# Patient Record
Sex: MALE | Race: WHITE | NOT HISPANIC OR LATINO | URBAN - METROPOLITAN AREA
[De-identification: names, ages, dates, MRNs, and addresses within clinical notes are randomized per-mention and may not be internally consistent; named-entity substitution may affect disease eponyms.]

---

## 2018-03-30 ENCOUNTER — EMERGENCY (EMERGENCY)
Facility: HOSPITAL | Age: 33
LOS: 1 days | Discharge: HOME | End: 2018-03-30
Admitting: EMERGENCY MEDICINE
Payer: MEDICAID

## 2018-03-30 PROCEDURE — 99283 EMERGENCY DEPT VISIT LOW MDM: CPT | Mod: 25

## 2018-03-31 ENCOUNTER — EMERGENCY (EMERGENCY)
Facility: HOSPITAL | Age: 33
LOS: 1 days | Discharge: HOME | End: 2018-03-31
Admitting: EMERGENCY MEDICINE
Payer: MEDICAID

## 2018-03-31 VITALS
DIASTOLIC BLOOD PRESSURE: 71 MMHG | OXYGEN SATURATION: 100 % | RESPIRATION RATE: 20 BRPM | HEART RATE: 82 BPM | SYSTOLIC BLOOD PRESSURE: 125 MMHG | TEMPERATURE: 98 F

## 2018-03-31 VITALS
SYSTOLIC BLOOD PRESSURE: 115 MMHG | TEMPERATURE: 97 F | OXYGEN SATURATION: 100 % | RESPIRATION RATE: 18 BRPM | DIASTOLIC BLOOD PRESSURE: 75 MMHG | HEART RATE: 89 BPM

## 2018-03-31 VITALS
HEART RATE: 68 BPM | SYSTOLIC BLOOD PRESSURE: 101 MMHG | DIASTOLIC BLOOD PRESSURE: 68 MMHG | RESPIRATION RATE: 16 BRPM | OXYGEN SATURATION: 98 % | TEMPERATURE: 98 F

## 2018-03-31 DIAGNOSIS — F43.24 ADJUSTMENT DISORDER WITH DISTURBANCE OF CONDUCT: ICD-10-CM

## 2018-03-31 DIAGNOSIS — R21 RASH AND OTHER NONSPECIFIC SKIN ERUPTION: ICD-10-CM

## 2018-03-31 DIAGNOSIS — Z76.5 MALINGERER [CONSCIOUS SIMULATION]: ICD-10-CM

## 2018-03-31 DIAGNOSIS — Z91.010 ALLERGY TO PEANUTS: ICD-10-CM

## 2018-03-31 DIAGNOSIS — F32.9 MAJOR DEPRESSIVE DISORDER, SINGLE EPISODE, UNSPECIFIED: ICD-10-CM

## 2018-03-31 LAB
ANION GAP SERPL CALC-SCNC: 9 MMOL/L — SIGNIFICANT CHANGE UP (ref 9–16)
APAP SERPL-MCNC: 3 UG/ML — LOW (ref 10–30)
APPEARANCE UR: (no result)
BASOPHILS NFR BLD AUTO: 0.5 % — SIGNIFICANT CHANGE UP (ref 0–2)
BILIRUB UR-MCNC: (no result)
BUN SERPL-MCNC: 13 MG/DL — SIGNIFICANT CHANGE UP (ref 7–23)
CALCIUM SERPL-MCNC: 8.9 MG/DL — SIGNIFICANT CHANGE UP (ref 8.5–10.5)
CHLORIDE SERPL-SCNC: 105 MMOL/L — SIGNIFICANT CHANGE UP (ref 96–108)
CO2 SERPL-SCNC: 28 MMOL/L — SIGNIFICANT CHANGE UP (ref 22–31)
COLOR SPEC: YELLOW — SIGNIFICANT CHANGE UP
CREAT SERPL-MCNC: 0.73 MG/DL — SIGNIFICANT CHANGE UP (ref 0.5–1.3)
DIFF PNL FLD: NEGATIVE — SIGNIFICANT CHANGE UP
EOSINOPHIL NFR BLD AUTO: 3.3 % — SIGNIFICANT CHANGE UP (ref 0–6)
ETHANOL SERPL-MCNC: <3 MG/DL — SIGNIFICANT CHANGE UP
GLUCOSE SERPL-MCNC: 89 MG/DL — SIGNIFICANT CHANGE UP (ref 70–99)
GLUCOSE UR QL: NEGATIVE — SIGNIFICANT CHANGE UP
HCT VFR BLD CALC: 36.4 % — LOW (ref 39–50)
HGB BLD-MCNC: 12.4 G/DL — LOW (ref 13–17)
IMM GRANULOCYTES NFR BLD AUTO: 0.2 % — SIGNIFICANT CHANGE UP (ref 0–1.5)
KETONES UR-MCNC: (no result) MG/DL
LEUKOCYTE ESTERASE UR-ACNC: NEGATIVE — SIGNIFICANT CHANGE UP
LYMPHOCYTES # BLD AUTO: 20.6 % — SIGNIFICANT CHANGE UP (ref 13–44)
MCHC RBC-ENTMCNC: 31.2 PG — SIGNIFICANT CHANGE UP (ref 27–34)
MCHC RBC-ENTMCNC: 34.1 G/DL — SIGNIFICANT CHANGE UP (ref 32–36)
MCV RBC AUTO: 91.5 FL — SIGNIFICANT CHANGE UP (ref 80–100)
MONOCYTES NFR BLD AUTO: 8.3 % — SIGNIFICANT CHANGE UP (ref 2–14)
NEUTROPHILS NFR BLD AUTO: 67.1 % — SIGNIFICANT CHANGE UP (ref 43–77)
NITRITE UR-MCNC: NEGATIVE — SIGNIFICANT CHANGE UP
PCP SPEC-MCNC: SIGNIFICANT CHANGE UP
PH UR: 8 — SIGNIFICANT CHANGE UP (ref 5–8)
PLATELET # BLD AUTO: 385 K/UL — SIGNIFICANT CHANGE UP (ref 150–400)
POTASSIUM SERPL-MCNC: 3.5 MMOL/L — SIGNIFICANT CHANGE UP (ref 3.5–5.3)
POTASSIUM SERPL-SCNC: 3.5 MMOL/L — SIGNIFICANT CHANGE UP (ref 3.5–5.3)
PROT UR-MCNC: (no result) MG/DL
RBC # BLD: 3.98 M/UL — LOW (ref 4.2–5.8)
RBC # FLD: 12.6 % — SIGNIFICANT CHANGE UP (ref 10.3–16.9)
SALICYLATES SERPL-MCNC: 1.2 MG/DL — LOW (ref 2.8–20)
SODIUM SERPL-SCNC: 142 MMOL/L — SIGNIFICANT CHANGE UP (ref 132–145)
SP GR SPEC: 1.02 — SIGNIFICANT CHANGE UP (ref 1–1.03)
TSH SERPL-MCNC: 2.7 UIU/ML — SIGNIFICANT CHANGE UP (ref 0.36–3.74)
UROBILINOGEN FLD QL: 0.2 E.U./DL — SIGNIFICANT CHANGE UP
WBC # BLD: 9.6 K/UL — SIGNIFICANT CHANGE UP (ref 3.8–10.5)
WBC # FLD AUTO: 9.6 K/UL — SIGNIFICANT CHANGE UP (ref 3.8–10.5)

## 2018-03-31 PROCEDURE — 90792 PSYCH DIAG EVAL W/MED SRVCS: CPT | Mod: GT

## 2018-03-31 PROCEDURE — 99284 EMERGENCY DEPT VISIT MOD MDM: CPT | Mod: 25

## 2018-03-31 RX ORDER — OXYCODONE AND ACETAMINOPHEN 5; 325 MG/1; MG/1
1 TABLET ORAL ONCE
Qty: 0 | Refills: 0 | Status: COMPLETED | OUTPATIENT
Start: 2018-03-31 | End: 2018-03-31

## 2018-03-31 RX ORDER — FLUOXETINE HCL 10 MG
1 CAPSULE ORAL
Qty: 7 | Refills: 0 | OUTPATIENT
Start: 2018-03-31 | End: 2018-04-06

## 2018-03-31 RX ORDER — KETOROLAC TROMETHAMINE 30 MG/ML
30 SYRINGE (ML) INJECTION ONCE
Qty: 0 | Refills: 0 | Status: COMPLETED | OUTPATIENT
Start: 2018-03-31 | End: 2018-03-31

## 2018-03-31 NOTE — ED PROVIDER NOTE - CONSTITUTIONAL, MLM
normal... Unkempt, but awake, alert, oriented to person, place, time/situation and in no apparent distress.

## 2018-03-31 NOTE — ED BEHAVIORAL HEALTH ASSESSMENT NOTE - DESCRIPTION (FIRST USE, LAST USE, QUANTITY, FREQUENCY, DURATION)
unclear - pt is unreliable historian pt states she has "tried everything" but denies any recent or ongoing use of illicit drugs

## 2018-03-31 NOTE — ED ADULT NURSE REASSESSMENT NOTE - NS ED NURSE REASSESS COMMENT FT1
1:1 discontinued, belongings returned, pt moved from room 23 to Chair A awaiting disposition. Will monitor and f/u as indicated.

## 2018-03-31 NOTE — ED PROVIDER NOTE - PHYSICAL EXAMINATION
VITAL SIGNS: I have reviewed nursing notes and confirm.  CONSTITUTIONAL: Well-developed; well-nourished; in no acute distress.  SKIN: Skin is warm and dry, no acute rash.  HEAD: Normocephalic; atraumatic.  EYES: PERRL, EOM intact; conjunctiva and sclera clear.  ENT: No nasal discharge; airway clear.  NECK: Supple; non tender.  CARD: S1, S2 normal; no murmurs, gallops, or rubs. Regular rate and rhythm.  RESP: No wheezes, rales or rhonchi.  ABD: Normal bowel sounds; soft; non-distended; non-tender; no hepatosplenomegaly.  BACK: No cervical, thoracic or lumbar spine tenderness to percussion or palpation. Flexion/extension of spine without pain. LE strength 5/5, sensation intact, no SI joint tenderness, SLR negative bilaterally  EXT: Normal ROM. No clubbing, cyanosis or edema.  NEURO: Alert, oriented. Grossly unremarkable.  PSYCH: Cooperative, appropriate

## 2018-03-31 NOTE — ED PROVIDER NOTE - PROGRESS NOTE DETAILS
Spoke with Dr. Lebron (psychiatry) who states patient is stable. Concerned for malingering also concerned of patient coming down from amphetamines, which urine is positive for.

## 2018-03-31 NOTE — ED ADULT NURSE NOTE - OBJECTIVE STATEMENT
Walk in c/o SI, denies HI, pt without plan, no attempt or injury on arrival, denies ETOH or Drug Use. Pt is transgender male --> female, states " I have no where to sleep or rest because everyone treats me like im a criminal" Pt with pressured speech, requesting food and beverage, meal provided. Lab samples collected and pt placed on 1:1 observation for safety. Will monitor and f/u as indicated.

## 2018-03-31 NOTE — ED PROVIDER NOTE - PROGRESS NOTE DETAILS
patient given medication for pain.  continues to just sleep in recliner. no SI/HI. patient offered to talk to psych, but declined. patient refusing to leave, just wants to sleep in recliner. escorted out by security.

## 2018-03-31 NOTE — ED PROVIDER NOTE - MEDICAL DECISION MAKING DETAILS
chronic pain complaint. no new injury/trauma, no indication for imaging. offered to speak to psych, but patient declined. no acute psychosis, no SI/HI.
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
## 2018-03-31 NOTE — ED PROVIDER NOTE - PSYCHIATRIC, MLM
Alert and oriented to person, place, time/situation. normal mood and affect. no apparent risk to self or others. No SI or HI.

## 2018-03-31 NOTE — ED ADULT NURSE NOTE - NSSISCREENINGSIGNS_ED_A_ED
triggering event  (ex. pending homelessness / incarceration)/anxiety/agitation/substance abuse ( +sbirt)

## 2018-03-31 NOTE — ED BEHAVIORAL HEALTH ASSESSMENT NOTE - SUMMARY
Pt's history & presentation are most consistent with a diagnosis of malingering for the secondary gain of room & board, though given her destitute psychosocial circumstances, an adjustment disorder with mixed anxiety & depressed mood is a reasonable rule out diagnosis.  Pt does not demonstrate any objective signs of psychosis & provides conflicting descriptions of her symptoms to various providers.  As such, it is my opinion that her suicidal ideation was feigned so that she could gain at least temporary admittance to the hospital.    From a psychiatric standpoint, this patient is stable & appropriate for discharge from the ED.

## 2018-03-31 NOTE — ED ADULT TRIAGE NOTE - CHIEF COMPLAINT QUOTE
walkin patient with complaints of suicidal ideations, plan to walk into traffic. Patient reports hx bipolar d/o ad has been without meds x 1 week. Patient has no thoughts of hurting other people. Patient is male -> female transgender. Also c/o sores/rash on legs that come and go 'for years.'

## 2018-03-31 NOTE — ED BEHAVIORAL HEALTH ASSESSMENT NOTE - OTHER
Pt provided with instructions on how to present to [facility] in [place] n/a Magn[place] Michelle not assessed

## 2018-03-31 NOTE — ED ADULT TRIAGE NOTE - CHIEF COMPLAINT QUOTE
Pt presents to ED with multiple medical complaints- patient reports a rash on her arms, legs and back, states she has been feeling more 'fatigued' than usual and short of breath. Patient's O2 Saturation 100% on RA, speaks in full sentences. Reports a hx of bipolar. Pt was recently evicted from her apartment, and she has been sleeping in the streets. Of note, patient was in the waiting area for > an hour before deciding to register.

## 2018-03-31 NOTE — ED PROVIDER NOTE - MEDICAL DECISION MAKING DETAILS
Pt presents c/o non specific, diffuse excoriated lesions and requesting psych evaluation. Will have pt rest with 1 to 1 DC. After speaking with psych, will have psychiatry assess after pt wakes up. Labs ordered. Pt presents c/o non specific, diffuse excoriated lesions and requesting psych evaluation. Will have pt rest with 1 to 1 DC. After speaking with psych, will have psychiatry assess after pt wakes up. Labs ordered.  cleared by psych pt reports feeling much better after sleeping in ed for 8 hours

## 2018-03-31 NOTE — ED BEHAVIORAL HEALTH ASSESSMENT NOTE - RISK ASSESSMENT
Pt is at chronically elevated risk for self-harm due to her reported history of suicide attempts & inpatient hospitalization.  This risk is acutely elevated by her recently becoming homeless & substance use; it is mitigated by her denial of suicidal ideation at the time of interview, future orientation & treatment seeking behavior.  In aggregate, pt's acute risk of harm to self is low.    Pt's chronic & acute risk of harm to others is low.

## 2018-03-31 NOTE — ED PROVIDER NOTE - OBJECTIVE STATEMENT
32 year old transgender M --> F presents with complaints of chronic pain. reports chronic pain for years, "takes whatever I can find for it." requesting medication for pain.  denies any new injury or trauma. admits to drinking alcohol tonight. no SI/HI.   very poor historian, continues to fall asleep during exam. was seen multiple times and will not stay awake. patient reports very sleepy

## 2018-03-31 NOTE — ED BEHAVIORAL HEALTH ASSESSMENT NOTE - REFERRAL / APPOINTMENT DETAILS
Pt to follow-up with established outpatient providers at [facility]; has follow-up appointment with her psychiatrist scheduled for Tuesday, April 3rd.

## 2018-03-31 NOTE — ED BEHAVIORAL HEALTH ASSESSMENT NOTE - DESCRIPTION
Pt slept for several hours prior to interview.  Otherwise none. migraine headaches Pt born & raised in [place] to a highly Bahai family who have essentially disowned her due to her transgender status. Moved to [place] roughly 12-13 years ago.

## 2018-03-31 NOTE — ED BEHAVIORAL HEALTH ASSESSMENT NOTE - OTHER PAST PSYCHIATRIC HISTORY (INCLUDE DETAILS REGARDING ONSET, COURSE OF ILLNESS, INPATIENT/OUTPATIENT TREATMENT)
As per HPI.  Pt states she has been receiving outpatient mental health services from [facility] for the past 12 years, currently has both a therapist & a psychiatrist.  Has an appointment to see psychiatrist on Tuesday.

## 2018-03-31 NOTE — ED PROVIDER NOTE - OBJECTIVE STATEMENT
31 yo M with Hx of BPD presents with multiple medical complaints. Pt states has had rash for 2 years and also is very tired. Pt notes is homeless and has been trying to sleep on sidewalk but constantly harassed. Pt also states has been hearing voices to jump in front of cars and has not slept in 5 days. Pt has only been taking prescription meds (Prozac, Adderall, and clonopin). Bryan any alcohol or drug use. Pt states want to stay here, rest, and see psychiatry.

## 2018-04-03 DIAGNOSIS — Z91.010 ALLERGY TO PEANUTS: ICD-10-CM

## 2018-04-03 DIAGNOSIS — M54.9 DORSALGIA, UNSPECIFIED: ICD-10-CM

## 2018-04-03 DIAGNOSIS — G89.29 OTHER CHRONIC PAIN: ICD-10-CM

## 2018-04-03 DIAGNOSIS — F17.200 NICOTINE DEPENDENCE, UNSPECIFIED, UNCOMPLICATED: ICD-10-CM

## 2018-09-02 ENCOUNTER — EMERGENCY (EMERGENCY)
Facility: HOSPITAL | Age: 33
LOS: 1 days | Discharge: HOME | End: 2018-09-02
Admitting: EMERGENCY MEDICINE
Payer: MEDICAID

## 2018-09-02 VITALS
DIASTOLIC BLOOD PRESSURE: 82 MMHG | SYSTOLIC BLOOD PRESSURE: 133 MMHG | OXYGEN SATURATION: 99 % | HEART RATE: 76 BPM | RESPIRATION RATE: 18 BRPM | TEMPERATURE: 98 F

## 2018-09-02 DIAGNOSIS — R11.0 NAUSEA: ICD-10-CM

## 2018-09-02 DIAGNOSIS — F19.939 OTHER PSYCHOACTIVE SUBSTANCE USE, UNSPECIFIED WITH WITHDRAWAL, UNSPECIFIED: ICD-10-CM

## 2018-09-02 DIAGNOSIS — Z91.010 ALLERGY TO PEANUTS: ICD-10-CM

## 2018-09-02 DIAGNOSIS — F17.200 NICOTINE DEPENDENCE, UNSPECIFIED, UNCOMPLICATED: ICD-10-CM

## 2018-09-02 DIAGNOSIS — Z79.899 OTHER LONG TERM (CURRENT) DRUG THERAPY: ICD-10-CM

## 2018-09-02 DIAGNOSIS — Z76.0 ENCOUNTER FOR ISSUE OF REPEAT PRESCRIPTION: ICD-10-CM

## 2018-09-02 DIAGNOSIS — F41.9 ANXIETY DISORDER, UNSPECIFIED: ICD-10-CM

## 2018-09-02 PROBLEM — G89.29 OTHER CHRONIC PAIN: Chronic | Status: ACTIVE | Noted: 2018-03-31

## 2018-09-02 PROBLEM — F32.9 MAJOR DEPRESSIVE DISORDER, SINGLE EPISODE, UNSPECIFIED: Chronic | Status: ACTIVE | Noted: 2018-03-31

## 2018-09-02 PROCEDURE — 99283 EMERGENCY DEPT VISIT LOW MDM: CPT | Mod: 25

## 2018-09-02 RX ORDER — ALPRAZOLAM 0.25 MG
1 TABLET ORAL ONCE
Qty: 0 | Refills: 0 | Status: DISCONTINUED | OUTPATIENT
Start: 2018-09-02 | End: 2018-09-02

## 2018-09-02 RX ORDER — FAMOTIDINE 10 MG/ML
20 INJECTION INTRAVENOUS ONCE
Qty: 0 | Refills: 0 | Status: COMPLETED | OUTPATIENT
Start: 2018-09-02 | End: 2018-09-02

## 2018-09-02 RX ORDER — ONDANSETRON 8 MG/1
8 TABLET, FILM COATED ORAL ONCE
Qty: 0 | Refills: 0 | Status: COMPLETED | OUTPATIENT
Start: 2018-09-02 | End: 2018-09-02

## 2018-09-02 RX ORDER — ONDANSETRON 8 MG/1
1 TABLET, FILM COATED ORAL
Qty: 10 | Refills: 0 | OUTPATIENT
Start: 2018-09-02

## 2018-09-02 RX ORDER — CLONAZEPAM 1 MG
1 TABLET ORAL ONCE
Qty: 0 | Refills: 0 | Status: DISCONTINUED | OUTPATIENT
Start: 2018-09-02 | End: 2018-09-02

## 2018-09-02 RX ORDER — ESOMEPRAZOLE MAGNESIUM 40 MG/1
1 CAPSULE, DELAYED RELEASE ORAL
Qty: 15 | Refills: 0 | OUTPATIENT
Start: 2018-09-02

## 2018-09-02 RX ADMIN — Medication 1 MILLIGRAM(S): at 04:08

## 2018-09-02 RX ADMIN — ONDANSETRON 8 MILLIGRAM(S): 8 TABLET, FILM COATED ORAL at 04:08

## 2018-09-02 RX ADMIN — FAMOTIDINE 20 MILLIGRAM(S): 10 INJECTION INTRAVENOUS at 04:08

## 2018-09-02 NOTE — ED ADULT NURSE NOTE - OBJECTIVE STATEMENT
Pt is a 33y male complaining of medication refill. Pt states that his stomach has been bothering him and he is scared to vomit. Pt states that he also needs medication for his anxiety.

## 2018-09-02 NOTE — ED PROVIDER NOTE - MEDICAL DECISION MAKING DETAILS
AFVSS at time of d/c, pt non-toxic appearing, results, ddx, and f/u plans discussed with pt at bedside, d/c'd home to f/u with PMD, strict return precautions discussed, prompt return to ER for any worsening or new sx, pt verbalized understanding. ISTOP records reviewed, [id number], no h/o klonopin use, last prescribed narcotic one yr ago, no s/s of psychiatric instability on exam, d/c'd home to f/u with PMD and outpt psych, strict return precautions discussed, prompt return to ER for any worsening or new sx, pt verbalized understanding.

## 2018-09-02 NOTE — ED ADULT NURSE NOTE - NSIMPLEMENTINTERV_GEN_ALL_ED
Implemented All Universal Safety Interventions:  [place] to call system. Call bell, personal items and telephone within reach. Instruct patient to call for assistance. Room bathroom lighting operational. Non-slip footwear when patient is off stretcher. Physically safe environment: no spills, clutter or unnecessary equipment. Stretcher in lowest position, wheels locked, appropriate side rails in place.

## 2018-09-02 NOTE — ED PROVIDER NOTE - CARE PLAN
Principal Discharge DX:	Medication refill Principal Discharge DX:	Medication refill  Secondary Diagnosis:	Anxiety Principal Discharge DX:	Withdrawal from other psychoactive substance  Secondary Diagnosis:	Anxiety

## 2018-09-02 NOTE — ED ADULT TRIAGE NOTE - CHIEF COMPLAINT QUOTE
pt states that he needs a refill of his klonopin. states that he is feeling shaky and nauseous because he has not had a dose. luggage upon arrival.

## 2018-09-02 NOTE — ED ADULT NURSE NOTE - CHPI ED NUR SYMPTOMS NEG
no vomiting/no nausea/no pain/no weakness/no decreased eating/drinking/no dizziness/no chills/no fever/no tingling

## 2018-09-02 NOTE — ED PROVIDER NOTE - OBJECTIVE STATEMENT
32 yo M with PMHx of anxiety, bipolar, schizoaffective, and GERD, presenting c/o nausea, tremors, feeling anxious x 1d.  Pt reports being on klonopin 2mg daily in the past, last dose 5d ago, noted increased nausea, tremors, feeling jittery and anxious in the past day. Denies SI/HI/AH/VH/delusion, HA, dizziness, CP, SOB, palpitations, V/D/C, abdominal pain, change in urinary/bowel function, tremors, focal weakness, and fever/chills.  No illicit drug use noted

## 2018-09-02 NOTE — ED PROVIDER NOTE - PHYSICAL EXAMINATION
Gen - WDWN, NAD, comfortable and non-toxic appearing  Skin - warm, dry, intact   HEENT - AT/NC, airway patent, neck supple   CV - S1S2, R/R/R  Resp - CTAB, no r/r/w  GI - soft, ND, NT, no CVAT b/l   MS - w/w/p, no c/c/e  Psych - euphoric, normal speech and eye contact, judgement and insight intact, no SI/HI/AH/VH/delusion   Neuro - AxOx3, ambulatory without gait disturbance Gen - Unkempt M, NAD, comfortable and non-toxic appearing  Skin - warm, dry, intact   HEENT - AT/NC, airway patent, neck supple   CV - S1S2, R/R/R  Resp - CTAB, no r/r/w  GI - soft, ND, NT, no CVAT b/l  MS - w/w/p, no c/c/e  Psych - euphoric, normal speech and eye contact, judgement and insight intact, no SI/HI/AH/VH/delusion   Neuro - AxOx3, ambulatory without gait disturbance

## 2019-04-03 ENCOUNTER — EMERGENCY (EMERGENCY)
Facility: HOSPITAL | Age: 34
LOS: 1 days | Discharge: HOME | End: 2019-04-03
Attending: EMERGENCY MEDICINE | Admitting: EMERGENCY MEDICINE
Payer: MEDICAID

## 2019-04-03 VITALS
OXYGEN SATURATION: 98 % | HEART RATE: 77 BPM | DIASTOLIC BLOOD PRESSURE: 60 MMHG | TEMPERATURE: 98 F | RESPIRATION RATE: 16 BRPM | SYSTOLIC BLOOD PRESSURE: 115 MMHG

## 2019-04-03 VITALS
TEMPERATURE: 98 F | DIASTOLIC BLOOD PRESSURE: 90 MMHG | SYSTOLIC BLOOD PRESSURE: 132 MMHG | OXYGEN SATURATION: 96 % | HEART RATE: 95 BPM | RESPIRATION RATE: 20 BRPM

## 2019-04-03 DIAGNOSIS — F39 UNSPECIFIED MOOD [AFFECTIVE] DISORDER: ICD-10-CM

## 2019-04-03 LAB
ALBUMIN SERPL ELPH-MCNC: 3.8 G/DL — SIGNIFICANT CHANGE UP (ref 3.4–5)
ALP SERPL-CCNC: 63 U/L — SIGNIFICANT CHANGE UP (ref 40–120)
ALT FLD-CCNC: 33 U/L — SIGNIFICANT CHANGE UP (ref 12–42)
ANION GAP SERPL CALC-SCNC: 12 MMOL/L — SIGNIFICANT CHANGE UP (ref 9–16)
APAP SERPL-MCNC: <2 UG/ML — LOW (ref 10–30)
APPEARANCE UR: CLEAR — SIGNIFICANT CHANGE UP
AST SERPL-CCNC: 26 U/L — SIGNIFICANT CHANGE UP (ref 15–37)
BASOPHILS NFR BLD AUTO: 0.4 % — SIGNIFICANT CHANGE UP (ref 0–2)
BILIRUB SERPL-MCNC: 0.2 MG/DL — SIGNIFICANT CHANGE UP (ref 0.2–1.2)
BILIRUB UR-MCNC: NEGATIVE — SIGNIFICANT CHANGE UP
BUN SERPL-MCNC: 23 MG/DL — SIGNIFICANT CHANGE UP (ref 7–23)
CALCIUM SERPL-MCNC: 8.9 MG/DL — SIGNIFICANT CHANGE UP (ref 8.5–10.5)
CHLORIDE SERPL-SCNC: 105 MMOL/L — SIGNIFICANT CHANGE UP (ref 96–108)
CK SERPL-CCNC: 94 U/L — SIGNIFICANT CHANGE UP (ref 39–308)
CO2 SERPL-SCNC: 27 MMOL/L — SIGNIFICANT CHANGE UP (ref 22–31)
COLOR SPEC: YELLOW — SIGNIFICANT CHANGE UP
CREAT SERPL-MCNC: 0.88 MG/DL — SIGNIFICANT CHANGE UP (ref 0.5–1.3)
DIFF PNL FLD: ABNORMAL
EOSINOPHIL NFR BLD AUTO: 1.6 % — SIGNIFICANT CHANGE UP (ref 0–6)
ETHANOL SERPL-MCNC: <3 MG/DL — SIGNIFICANT CHANGE UP
GLUCOSE SERPL-MCNC: 90 MG/DL — SIGNIFICANT CHANGE UP (ref 70–99)
GLUCOSE UR QL: NEGATIVE — SIGNIFICANT CHANGE UP
HCT VFR BLD CALC: 42 % — SIGNIFICANT CHANGE UP (ref 39–50)
HGB BLD-MCNC: 13.4 G/DL — SIGNIFICANT CHANGE UP (ref 13–17)
IMM GRANULOCYTES NFR BLD AUTO: 0.8 % — SIGNIFICANT CHANGE UP (ref 0–1.5)
KETONES UR-MCNC: NEGATIVE — SIGNIFICANT CHANGE UP
LEUKOCYTE ESTERASE UR-ACNC: NEGATIVE — SIGNIFICANT CHANGE UP
LYMPHOCYTES # BLD AUTO: 15.6 % — SIGNIFICANT CHANGE UP (ref 13–44)
MCHC RBC-ENTMCNC: 28.9 PG — SIGNIFICANT CHANGE UP (ref 27–34)
MCHC RBC-ENTMCNC: 31.9 G/DL — LOW (ref 32–36)
MCV RBC AUTO: 90.5 FL — SIGNIFICANT CHANGE UP (ref 80–100)
MONOCYTES NFR BLD AUTO: 7.3 % — SIGNIFICANT CHANGE UP (ref 2–14)
NEUTROPHILS NFR BLD AUTO: 74.3 % — SIGNIFICANT CHANGE UP (ref 43–77)
NITRITE UR-MCNC: NEGATIVE — SIGNIFICANT CHANGE UP
PCP SPEC-MCNC: SIGNIFICANT CHANGE UP
PH UR: 6 — SIGNIFICANT CHANGE UP (ref 5–8)
PLATELET # BLD AUTO: 348 K/UL — SIGNIFICANT CHANGE UP (ref 150–400)
POTASSIUM SERPL-MCNC: 3.8 MMOL/L — SIGNIFICANT CHANGE UP (ref 3.5–5.3)
POTASSIUM SERPL-SCNC: 3.8 MMOL/L — SIGNIFICANT CHANGE UP (ref 3.5–5.3)
PROT SERPL-MCNC: 6.9 G/DL — SIGNIFICANT CHANGE UP (ref 6.4–8.2)
PROT UR-MCNC: NEGATIVE MG/DL — SIGNIFICANT CHANGE UP
RBC # BLD: 4.64 M/UL — SIGNIFICANT CHANGE UP (ref 4.2–5.8)
RBC # FLD: 14.6 % — HIGH (ref 10.3–14.5)
SALICYLATES SERPL-MCNC: 1.3 MG/DL — LOW (ref 2.8–20)
SODIUM SERPL-SCNC: 144 MMOL/L — SIGNIFICANT CHANGE UP (ref 132–145)
SP GR SPEC: 1.02 — SIGNIFICANT CHANGE UP (ref 1–1.03)
TROPONIN I SERPL-MCNC: <0.017 NG/ML — LOW (ref 0.02–0.06)
UROBILINOGEN FLD QL: 0.2 E.U./DL — SIGNIFICANT CHANGE UP
WBC # BLD: 14 K/UL — HIGH (ref 3.8–10.5)
WBC # FLD AUTO: 14 K/UL — HIGH (ref 3.8–10.5)

## 2019-04-03 PROCEDURE — 99285 EMERGENCY DEPT VISIT HI MDM: CPT | Mod: 25

## 2019-04-03 PROCEDURE — 70450 CT HEAD/BRAIN W/O DYE: CPT | Mod: 26

## 2019-04-03 PROCEDURE — 93010 ELECTROCARDIOGRAM REPORT: CPT

## 2019-04-03 PROCEDURE — 90792 PSYCH DIAG EVAL W/MED SRVCS: CPT | Mod: GT

## 2019-04-03 RX ORDER — CLONAZEPAM 1 MG
1 TABLET ORAL ONCE
Qty: 0 | Refills: 0 | Status: DISCONTINUED | OUTPATIENT
Start: 2019-04-03 | End: 2019-04-03

## 2019-04-03 RX ORDER — CLONAZEPAM 1 MG
0.5 TABLET ORAL ONCE
Qty: 0 | Refills: 0 | Status: DISCONTINUED | OUTPATIENT
Start: 2019-04-03 | End: 2019-04-03

## 2019-04-03 RX ORDER — CLONAZEPAM 1 MG
0 TABLET ORAL
Qty: 0 | Refills: 0 | COMMUNITY

## 2019-04-03 RX ORDER — QUETIAPINE FUMARATE 200 MG/1
0 TABLET, FILM COATED ORAL
Qty: 0 | Refills: 0 | COMMUNITY

## 2019-04-03 RX ADMIN — Medication 1 MILLIGRAM(S): at 11:21

## 2019-04-03 RX ADMIN — Medication 0.5 MILLIGRAM(S): at 08:56
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
## 2019-04-03 NOTE — ED ADULT NURSE NOTE - HPI (INCLUDE ILLNESS QUALITY, SEVERITY, DURATION, TIMING, CONTEXT, MODIFYING FACTORS, ASSOCIATED SIGNS AND SYMPTOMS)
Pt states he has been diagnosed severely bipolar but has been having worsening suicidal thoughts and auditory hallucinations. States he has thoughts of "ripping out the veins in my throat" and states he has thoughts of hurting others because he is so angry.
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
## 2019-04-03 NOTE — ED BEHAVIORAL HEALTH ASSESSMENT NOTE - DESCRIPTION (FIRST USE, LAST USE, QUANTITY, FREQUENCY, DURATION)
has "tried everything" per patient in past. Amphetamine + on utox alcohol abuse in her youth cannabis in her youth has "tried everything" per patient in past. Amphetamine + on utox- per patient from prior prescription, took last pill today

## 2019-04-03 NOTE — ED BEHAVIORAL HEALTH ASSESSMENT NOTE - RISK ASSESSMENT
High risk of danger to self and others.   Risk factors: homeless, non-compliant, prior psychiatric history, homicidal and suicidal ideation, prior suicide attempts, hx abuse, transgender, substance abuse, isolation, axis II traits   Protective: help-seeking, no legal history

## 2019-04-03 NOTE — ED ADULT NURSE NOTE - NSIMPLEMENTINTERV_GEN_ALL_ED
Implemented All Universal Safety Interventions:  [place] to call system. Call bell, personal items and telephone within reach. Instruct patient to call for assistance. Room bathroom lighting operational. Non-slip footwear when patient is off stretcher. Physically safe environment: no spills, clutter or unnecessary equipment. Stretcher in lowest position, wheels locked, appropriate side rails in place.

## 2019-04-03 NOTE — ED PROVIDER NOTE - CLINICAL SUMMARY MEDICAL DECISION MAKING FREE TEXT BOX
hx of bipolar d/o, w/ AH, worsening sx, reports SI, pressured speech, no VH, will check labs and psych evaluation

## 2019-04-03 NOTE — ED PROVIDER NOTE - PHYSICAL EXAMINATION
CON: ao x 3, HENMT: clear oropharynx, soft neck, HEAD: atraumatic, CV: rrr, equal pulses b/l, RESP: cta b/l, GI: +BS, soft, nontender, no rebound, no guarding, SKIN: no rash, MSK: no deformities, NEURO: no gross motor or sensory deficit, PSYCH: AH, reports SI, cooperative, pressured speech

## 2019-04-03 NOTE — ED BEHAVIORAL HEALTH ASSESSMENT NOTE - AXIS IV
Economic problems/Problems with access to healthcare services/Housing problems/Occupational problems/Problems with primary support/Problem related to social environment

## 2019-04-03 NOTE — ED ADULT TRIAGE NOTE - CHIEF COMPLAINT QUOTE
Pt in ED reporting hearing voices and feeling suicidal. Pt in ED reporting hearing voices and feeling suicidal. Placed on tech watch upon arrival

## 2019-04-03 NOTE — ED BEHAVIORAL HEALTH ASSESSMENT NOTE - PSYCHIATRIC ISSUES AND PLAN (INCLUDE STANDING AND PRN MEDICATION)
Will need to find bed for patient. In the meantime for acute anxiety and agitation can give haldol 5mg po/im and ativan 2mg po/im q6h prn.
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
## 2019-04-03 NOTE — ED BEHAVIORAL HEALTH ASSESSMENT NOTE - OTHER PAST PSYCHIATRIC HISTORY (INCLUDE DETAILS REGARDING ONSET, COURSE OF ILLNESS, INPATIENT/OUTPATIENT TREATMENT)
Patient reports prior hx ADHD and bipolar disorder  Numerous prior hospitalizations, last per [facility] 11/23/18-12/6/18 for Mood disorder   [facility]: Gender Dysphoria, Psychotic disorder, unspecified, ADHD, major depressive disorder, PTSD Unspecified Anxiety Disorder, Gender identity disorders, Unspecified Depressive Disorder, Adjustment Disorder, Disruptive Mood Dysregulation Disorder, Obsessive-compulsive disorder, Other psychoactive substance related disorders, Phobia-Agoraphobia Patient reports prior hx ADHD and bipolar disorder  Numerous prior hospitalizations, last per [facility] 11/23/18-12/6/18 at [facility] for Mood disorder   PSYCKES: Gender Dysphoria, Psychotic disorder, unspecified, ADHD, major depressive disorder, PTSD Unspecified Anxiety Disorder, Gender identity disorders, Unspecified Depressive Disorder, Adjustment Disorder, Disruptive Mood Dysregulation Disorder, Obsessive-compulsive disorder, Other psychoactive substance related disorders, Phobia-Agoraphobia

## 2019-04-03 NOTE — ED BEHAVIORAL HEALTH ASSESSMENT NOTE - REASON
No beds available at [facility], [facility],  and [facility]
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
## 2019-04-03 NOTE — ED ADULT NURSE REASSESSMENT NOTE - NS ED NURSE REASSESS COMMENT FT1
VSS. medicated for anxiety per order. pending dispo. continue 1:1 for suicidal statements
call to tiffany goldstein for report [phone]
ewa completed with EMS and psych
pt for admission, awaiting bed.
spoke with Wil at admissions
telepsych to interview patient.
transport here pending EMS supervisor eval prior to transfer
Pt being assessed by telepsychiatrist at this time.
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
## 2019-04-07 DIAGNOSIS — Z79.899 OTHER LONG TERM (CURRENT) DRUG THERAPY: ICD-10-CM

## 2019-04-07 DIAGNOSIS — R45.851 SUICIDAL IDEATIONS: ICD-10-CM

## 2019-04-07 DIAGNOSIS — F32.9 MAJOR DEPRESSIVE DISORDER, SINGLE EPISODE, UNSPECIFIED: ICD-10-CM

## 2019-04-07 DIAGNOSIS — Z91.010 ALLERGY TO PEANUTS: ICD-10-CM

## 2019-09-25 NOTE — ED PROVIDER NOTE - NSREASONFORTRANSFER_ED_A_ED
Milana does not have hearing aid benefits and does not wish to order any at this time  
Higher Level of Care or Service Not Available

## 2019-11-03 NOTE — ED BEHAVIORAL HEALTH ASSESSMENT NOTE - AXIS IV
[FreeTextEntry1] : 36 yo male with hx of ROMMEL and OAD presents for follow up. The patient is "OK" on montelukast alone, refusing ICS. He is also refusing CPAP use. Presently he denies cough, chest pain or hemoptysis. Problems with primary support/Problems with access to healthcare services/Occupational problems/Housing problems/Economic problems

## 2023-06-16 NOTE — ED PROVIDER NOTE - NS ED ROS FT
*Note to patient: The 21st Century Cures Act makes medical notes like these available to patients in the interest of transparency. However, this is a medical document intended as peer to peer communication. It is written in medical language and may contain abbreviations or verbiage that are unfamiliar.  It may appear blunt or direct. Medical documents are intended to carry relevant information, facts as evident, and the clinical impression/opinion of the practitioner*     PCP: Apollo Urban, DO      It was a pleasure to see Rock Gonzalez for for follow-up today at the [facility].    IMPRESSION & RECOMMENDATIONS:   Independently reviewed clinical history, pertinent medical records ([facility]), and physical exam as well as various CV studies with patient.     # Coronary artery calcifications - noted on heart scan with total calcium score 759 (11/2018)  - subsequent cardiac cath with severe LAD/Dg stenosis s/p 3.5 x 23 mm Xience [place] SANDRA (mid-LAD) + balloon angioplasty to D1 as well as discrete severe stenosis involving distal RCA s/p 3.0 x 15 mm Xience [place] SANDRA  - recent stress MPI and corresponding echocardiogram indicated mild segmental LV dysfunction and small fixed apical perfusion defect  - remains asymptomatic for angina or HF with normal exercise tolerance   - Reviewed various medications of proven cardiovascular benefit. Advised dual antiplatelet therapy with aspirin and ticagrelor for 24-30 months to reduce incidence for stent thrombosis (per DAPT trial). Continue BB + LLA  - to assess for residual CV risk, check lipoprotein (a) and hs-CRP    # Carotid artery disease - mild bilateral carotid disease by Doppler.   - Unchanged on recent study per HPI   - asymptomatic. CAD-risk equivalent.   - Follow clinically and with [facility] carotid [facility]     # Family history of coronary artery disease     # Hypertension - BP ~ goal <130/80mmHg (per 2017 ACC/AHA guidelines).   -  previous titrated carvedilol 12.5 mg twice daily.   - Encouraged to maintain an exercise program in attempt to lower their blood pressure.      # Hyperlipidemia - lipid targets of LDL optimally <70mg/dL (per 2017 AACE/ACE guidelines), HDL >40mg/dL and triglycerides <150mg/dL.   - continue rosuvastatin   - Advised various dietary and lifestyle changes.      # Preventive Cardiology - emphasized lifestyle changes of weight loss, moderate intensity aerobic exercise at least 30-40 min 5 days per week (preferentially 7days/week), dietary salt and fat restriction.   - READ: Stretching to Stay Young: Simple Workouts to Keep You Flexible, Energized, and Pain Free (by Leslie Reyes)  Carmen Quinn; Prevent and Reverse Heart Disease  - Podcasts: [employer] Podcast #552: \"How to Optimize Your Metabolism\"  Nutrition Rounds Podcast: Episode 1 - \"Dr. Salvatore Naidu and Erica Naidu\"  - Received Pfizer vaccine x3 (11/15/2021, 3/6/2021, 2/5/2021); advised patient that he would benefit from booster dose when available  - After-visit summary provided to patient.      # Return visit in 6 months or sooner if needed.  - all questions were answered to the patient's satisfaction and to the best of my abilities.      HISTORY OF PRESENT ILLNESS:     As you know, he is a 67 year old   male with cardiovascular issues including coronary artery disease, Carotid artery stenosis, HLD and family hx of CAD. He was last seen in cardiology clinic by me 12/2022. Over the interim time period, he has done reasonably well and reports that his exercise tolerance is more unchanged; able to walk several miles and can climb 1-2 flights of stairs. Understands heart-healthy lifestyle and making appropriate changes. Stays active walking three times weekly for 30 minutes, \"try to watch my heart rate.\" Monitors home BP which showed good control. Tries to be vigilant about dietary sodium and fat intake. Continues to tolerate LLA rosuvastatin.  Brings in concerns about residual CV inflammation, \"read about it in the [employer] Journal (regarding colchicine). Do we need to check lipoprotein (a) and hs-CRP?\" Still practices social distancing, \"wear masks when I leave the house...\" Otherwise, he denies symptoms of chest pain, shortness of breath, dyspnea on exertion, palpitations,       Previous Medical History:   Patient Active Problem List   Diagnosis   • Coronary artery disease involving native coronary artery of native heart without angina pectoris   • Mixed hyperlipidemia   • Benign essential HTN   • S/P right coronary artery (RCA) stent placement   • Bilateral carotid artery stenosis   • Combined forms of age-related cataract of both eyes   • Subclinical hypothyroidism   • Abnormal ultrasound of bladder   • Benign prostatic hyperplasia with nocturia   • Lumbar spinal stenosis   • Lumbar disc herniation         He is allergic to penicillin g and penicillins.      Medications:    Current Outpatient Medications   Medication Sig Dispense Refill   • fluticasone (FLONASE) 50 MCG/ACT nasal spray USE 2 SPRAYS IN EACH NOSTRIL DAILY. SHAKE LIQUID 48 g 11   • carvedilol (COREG) 12.5 MG tablet Take 1 tablet by mouth in the morning and 1 tablet in the evening. Take with meals. 180 tablet 3   • ticagrelor (BRILINTA) 60 MG tablet Take 1 tablet by mouth in the morning and 1 tablet in the evening. 180 tablet 3   • rosuvastatin (CRESTOR) 20 MG tablet Take 1 tablet by mouth daily. 90 tablet 3   • aspirin 81 MG tablet Take 81 mg by mouth daily.     • b complex vitamins tablet Take 1 tablet by mouth daily.      • vitamin E 100 units capsule Take 100 Units by mouth daily.      • Multiple Vitamins-Minerals (MULTIVITAMIN ADULT PO) Take 1 tablet by mouth daily.      • Ascorbic Acid (VITAMIN C) 100 MG tablet Take 100 mg by mouth daily.       No current facility-administered medications for this visit.       Social History:   Social History     Socioeconomic History   • Marital  status: /Civil Union   Tobacco Use   • Smoking status: Never   • Smokeless tobacco: Never   Vaping Use   • Vaping Use: never used   Substance and Sexual Activity   • Alcohol use: Yes     Alcohol/week: 3.0 - 4.0 standard drinks of alcohol     Types: 3 - 4 Glasses of wine per week     Comment: socially    • Drug use: Never   • Sexual activity: Yes     Partners: Female    Appointed medical health care power of  is spouse Rosa Diaz      Family History:  Problem Relation Age of Onset   • Hypertension Mother     • Heart Attack Father 71   • Heart Attack Paternal Grandfather 62   • Heart Attack Maternal Uncle 50         Review of Systems   Constitutional: Negative for fever, chills, weight loss, malaise/fatigue, and weakness.   Skin: Negative for rash and itching.   HENT: Negative for headaches, hearing loss, nosebleeds and congestion.   Eyes: Negative for blurred vision, and eye pain.   Cardiovascular: See HPI.   Respiratory: See HPI.    Gastrointestinal: Negative for nausea, abdominal pain, diarrhea and constipation.   Genitourinary: Negative for dysuria, urgency, frequency and hematuria.   Musculoskeletal: Negative for myalgias, neck pain, back pain, and falls.   Endo/Heme/Allergies: Negative for environmental allergies and polydipsia. Does not bruise/bleed easily.   Neurological: Negative for dizziness, tingling, tremors, focal weakness and loss of consciousness.   Psychiatric: Negative for depression and memory loss. Is not nervous/anxious and does not have insomnia.       OBJECTIVE:  Visit Vitals  /74   Pulse 75   Resp 18   Ht 6' 2\" (1.88 m)   Wt 93.4 kg (206 lb)   SpO2 98%   BMI 26.45 kg/m²     Physical Exam   Constitutional: appears well-developed and well-nourished. No apparent distress.   HENT: NC/AT. Nose normal. MMM. EOMI, PERRL. No icterus.   Neck: Supple. No JVD, thyromegaly and carotid bruit.   Cardiovascular: Normal rate, regular rhythm and normal heart sounds. Normal S1 and S2. A2/P2  intact and preserved.  PMI nondisplaced. No murmurs, rubs, or gallops.   Pulmonary/Chest: Effort and breath sounds normal.   Abdominal: Bowel sounds are normal. Soft, benign without organomegaly. No abdominal bruits.   Musculoskeletal: Normal range of motion of neck, back, upper extremities, and lower extremities.   Neurological: Alert and oriented x3. Gait intact.  Extremities: No edema. Radial, femoral, dorsalis pedis and posterior tibial pulses intact 1-2+. No femoral bruits.  Skin: Skin is warm and dry. Not diaphoretic.   Psychiatric: mildly anxious but otherwise appropriate.        LABS/TESTING:     EKG (9/24/2021): Study independently reviewed with my interpretations provided  - normal sinus rhythm, LAFB    EKG (9/22/2020): Study independently reviewed with my interpretations provided  - normal sinus rhythm, LAFB    EKG (11/13/2018): Study independently reviewed with my interpretations provided  - normal sinus rhythm      Echocardiogram 9/8/2021:  1. Left ventricle: The cavity size is normal. Wall thickness is mildly increased. There is mild concentric hypertrophy. Systolic function is normal. The ejection fraction was measured by biplane method of disks. Doppler parameters are consistent with abnormal left ventricular relaxation (grade 1 diastolic dysfunction). The ejection fraction is 59%.  2. Regional wall motion abnormalities: Hypokinesis of the apical septal myocardium.    Echocardiogram (11/19/2018):     * Normal LV size with normal function. LVEF=55%.    * There is hypokinesis of the apical infero-septum seen     * Diastolic dysfunction per 2016 MORE guidelines.    * There is no left ventricular hypertrophy.    * Normal right ventricular size with normal function.    * Normal left and right atria.    * Trileaflet aortic valve with minimally thickened leaflets and mild calcification of the annulus. There is no aortic regurgitation or stenosis.    * Normal mitral valve with mild mitral regurgitation.    *  Normal tricuspid valve with trace tricuspid regurgitation.    * Normal PA pressure.(26 mmHg)    * Normal pulmonic valve.    Cardiac cath (1/7/2019-[facility]):  1.  Severe 1-vessel obstructive coronary artery disease (RCA).    2.  Successful PCI of distal RCA with placement of 3.0 x 15 mm Xience Chula SANDRA.    3.  Normal filling pressures.        Cardiac cath (12/7/2018-[facility]):   1.  Right dominant coronary circulation.    2.  Severe 2-vessel obstructive coronary artery disease (LAD and RCA).    3.  Successful orbital atherectomy of the diffuse 99% heavily calcified, complex lesion in the mid LAD.    4.  Successful balloon angioplasty of the ostial 1st diagonal branch with a 2.5 x 12 mm [place] PTCA balloon.    5.  Successful PCI of mid LAD with placement of 3.5 x 23 mm Xience [place] SANDRA.      Treadmill stress MPI 12/3/2021:  Peak Heart Rate: 155  Peak % Target Reached: 101%    Peak Blood Pressure: 202/102  Peak Met: 8.50               Exercise Time: 7:01 mins  1. Normal myocardial perfusion examination.   2. The overall quality of the study is good.  3. Small fixed apical perfusion is seen  4. Normal left ventricular volume with mildly reduced systolic thickening and function and an ejection fraction of 45%.  5. Previous study from 2018 was compared and no significant changes have occurred.     Treadmill stress MPI (11/16/2018):   - 7.0 METs, peak  bpm, peak /108 mmHg, without EKG changes, and gated SPECT imaging demonstrated normal LV volume but reduced systolic function, LVEF 45% and partially fixed/reversible perfusion abnormality with anteroapical wall and apex    Heart scan (11/7/2018--[facility]):   - Total coronary calcium score 759 (RCA 4, left main 72, , left circumflex 4, PDA 4)    Carotid ultrasound 8/30/2022:  1. Less than 50% stenosis involving the right internal carotid artery.  2. Antegrade flow noted in the right vertebral artery.  3. Less than 50% stenosis involving the left  internal carotid artery.  4. Antegrade flow noted in the left vertebral artery.    Carotid US (1/11/2019):   - mild <50% B/l carotid stenosis with antegrade vertebral flow       External Lab on 05/19/2023   Component Date Value Ref Range Status   • T4, FREE 05/19/2023 1.0  0.8 - 1.8 ng/dL Final   External Lab on 05/19/2023   Component Date Value Ref Range Status   • TSH 05/19/2023 4.88 (H)  0.40 - 4.50 mIU/L Final   External Lab on 05/19/2023   Component Date Value Ref Range Status   • Glucose 05/19/2023 100 (H)  65 - 99 mg/dL Final   • BUN 05/19/2023 20  7 - 25 mg/dL Final   • Creatinine 05/19/2023 1.05  0.70 - 1.35 mg/dL Final   • GFR,ESTIMATE 05/19/2023 77  > OR = 60 mL/min/1.73m2 Final   • BUN/Creatinine Ratio 05/19/2023 NOT APPLICABLE  6 - 22 (calc) Final   • Sodium 05/19/2023 141  135 - 146 mmol/L Final   • Potassium 05/19/2023 4.1  3.5 - 5.3 mmol/L Final   • Chloride 05/19/2023 109  98 - 110 mmol/L Final   • Carbon Dioxide 05/19/2023 23  20 - 32 mmol/L Final   • CALCIUM 05/19/2023 9.1  8.6 - 10.3 mg/dL Final   • Fasting Status 05/19/2023 Fasting status not provided   Final   Lab Services on 03/01/2023   Component Date Value Ref Range Status   • Case Report 03/01/2023    Final                    Value:Non-Gynecological Cytology                        Case: [id number]                                 Authorizing Provider:  Yumiko Thorne DO          Collected:           03/01/2023 1313              Ordering Location:     [facility]     Received:            03/01/2023 [address] [place] [address]                                                       Specimen:    Urine                                                                                     • Cytologic Interpretation 03/01/2023    Final                    Value:This result contains rich text formatting which cannot be displayed here.   • Comment 03/01/2023    Final                    Value:This result  contains rich text formatting which cannot be displayed here.   • Clinical Information 03/01/2023    Final                    Value:This result contains rich text formatting which cannot be displayed here.   • Gross Description 03/01/2023    Final                    Value:This result contains rich text formatting which cannot be displayed here.   • Disclaimer 03/01/2023    Final                    Value:This result contains rich text formatting which cannot be displayed here.   Office Visit on 03/01/2023   Component Date Value Ref Range Status   • BLDR Scan mLs 03/01/2023 0   Final   Lab Services on 12/06/2022   Component Date Value Ref Range Status   • Sodium 12/06/2022 140  135 - 145 mmol/L Final   • Potassium 12/06/2022 4.5  3.4 - 5.1 mmol/L Final   • Chloride 12/06/2022 104  97 - 110 mmol/L Final   • Carbon Dioxide 12/06/2022 30  21 - 32 mmol/L Final   • Anion Gap 12/06/2022 11  7 - 19 mmol/L Final   • Glucose 12/06/2022 85  70 - 99 mg/dL Final   • BUN 12/06/2022 17  6 - 20 mg/dL Final   • Creatinine 12/06/2022 1.02  0.67 - 1.17 mg/dL Final   • Glomerular Filtration Rate 12/06/2022 80  >=60 Final   • BUN/Cr 12/06/2022 17  7 - 25 Final   • Calcium 12/06/2022 8.9  8.4 - 10.2 mg/dL Final   • T4, Free 12/06/2022 0.9  0.8 - 1.5 ng/dL Final   • Anti Thyroglobulin 12/06/2022 <0.9  0.0 - 4.0 IU/mL Final   • Thyroid Peroxidase Antibody 12/06/2022 48  <=60 Units/mL Final   • T3, Free 12/06/2022 2.9  2.2 - 4.0 pg/mL Final   External Lab on 11/30/2022   Component Date Value Ref Range Status   • PSA, Total 11/30/2022 0.54  < OR = 4.00 ng/mL Final   External Lab on 11/30/2022   Component Date Value Ref Range Status   • COLOR 11/30/2022 DARK YELLOW  YELLOW Final   • APPEARANCE 11/30/2022 TURBID (A)  CLEAR Final   • SPECIFIC GRAVITY 11/30/2022 1.026  1.001 - 1.035 Final   • pH 11/30/2022 > OR = 5.0  5.0 - 8.0 Final   • GLUCOSE(URINE) 11/30/2022 NEGATIVE  NEGATIVE Final   • BILIRUBIN 11/30/2022 NEGATIVE  NEGATIVE Final   •  KETONES 11/30/2022 TRACE (A)  NEGATIVE Final   • BLOOD 11/30/2022 NEGATIVE  NEGATIVE Final   • PROTEIN(URINE) 11/30/2022 NEGATIVE  NEGATIVE Final   • NITRITE 11/30/2022 NEGATIVE  NEGATIVE Final   • LEUKOCYTE ESTERASE 11/30/2022 NEGATIVE  NEGATIVE Final   • WBC 11/30/2022 NONE SEEN  < OR = 5 /HPF Final   • RBC 11/30/2022 0-2  < OR = 2 /HPF Final   • Squamous EPI'S 11/30/2022 NONE SEEN  < OR = 5 /HPF Final   • BACTERIA 11/30/2022 NONE SEEN  NONE SEEN /HPF Final   • CA OXALATE CRYSTALS 11/30/2022 MODERATE (A)  NONE OR FEW /HPF Final   • Hyaline Casts 11/30/2022 NONE SEEN  NONE SEEN /LPF Final   • URINE CULTURE REFLEX 11/30/2022 NO CULTURE INDICATED   Final   External Lab on 11/30/2022   Component Date Value Ref Range Status   • WBC 11/30/2022 6.2  3.8 - 10.8 Thousand/uL Final   • RBC 11/30/2022 4.55  4.20 - 5.80 Million/uL Final   • HGB 11/30/2022 14.7  13.2 - 17.1 g/dL Final   • HCT 11/30/2022 43.0  38.5 - 50.0 % Final   • MCV 11/30/2022 94.5  80.0 - 100.0 fL Final   • MCH 11/30/2022 32.3  27.0 - 33.0 pg Final   • MCHC 11/30/2022 34.2  32.0 - 36.0 g/dL Final   • RDW-CV 11/30/2022 12.6  11.0 - 15.0 % Final   • PLT 11/30/2022 187  140 - 400 Thousand/uL Final   • MPV 11/30/2022 10.5  7.5 - 12.5 fL Final   • Absolute Neutrophil 11/30/2022 3,317  1,500 - 7,800 cells/uL Final   • Absolute Lymph 11/30/2022 1,693  850 - 3,900 cells/uL Final   • Absolute Mono 11/30/2022 812  200 - 950 cells/uL Final   • Absolute Eos 11/30/2022 329  15 - 500 cells/uL Final   • Absolute Baso 11/30/2022 50  0 - 200 cells/uL Final   • Neutrophil 11/30/2022 53.5  % Final   • LYMPH 11/30/2022 27.3  % Final   • MONO 11/30/2022 13.1  % Final   • EOSIN 11/30/2022 5.3  % Final   • BASO 11/30/2022 0.8  % Final   External Lab on 11/30/2022   Component Date Value Ref Range Status   • TSH 11/30/2022 5.75 (H)  0.40 - 4.50 mIU/L Final   There may be more visits with results that are not included.        Thank you for the pleasure of caring for Rock Gonzalez.  If you have any questions, please do not hesitate to contact us.    Electronically signed by: Clinton Lentz MD, MS, [facility]  6/16/2023     [This note used Dragon technology. Transcription errors are not uncommon and may not have been corrected prior to electronically signing the note. Should you find these errors, please consult the clinician for interpretation (or apply common sense adjustment when safe and appropriate).]    · CONSTITUTIONAL: no fever and no chills.  · CARDIOVASCULAR: normal rate and rhythm, no chest pain and no edema.  · RESPIRATORY: no chest pain, no cough, and no shortness of breath.  · GASTROINTESTINAL: no abdominal pain, no bloating, no constipation, no diarrhea, no nausea and no vomiting.  · MUSCULOSKELETAL: + back pain, no musculoskeletal pain, no neck pain, and no weakness.  · SKIN: no abrasions, no jaundice, no lesions, no pruritis, and no rashes.  · NEURO: no loss of consciousness, no gait abnormality, no headache, no sensory deficits, and no weakness.  · PSYCHIATRIC: no known mental health issues.

## 2023-10-31 ENCOUNTER — EMERGENCY (EMERGENCY)
Facility: HOSPITAL | Age: 38
LOS: 1 days | Discharge: HOME | End: 2023-10-31
Admitting: EMERGENCY MEDICINE
Payer: MEDICAID

## 2023-10-31 VITALS
SYSTOLIC BLOOD PRESSURE: 124 MMHG | HEIGHT: 69 IN | WEIGHT: 130.07 LBS | RESPIRATION RATE: 16 BRPM | DIASTOLIC BLOOD PRESSURE: 80 MMHG | OXYGEN SATURATION: 98 % | HEART RATE: 102 BPM | TEMPERATURE: 98 F

## 2023-10-31 PROCEDURE — 99284 EMERGENCY DEPT VISIT MOD MDM: CPT

## 2023-10-31 PROCEDURE — 73110 X-RAY EXAM OF WRIST: CPT | Mod: 26,LT

## 2023-10-31 RX ORDER — ACETAMINOPHEN 500 MG
650 TABLET ORAL ONCE
Refills: 0 | Status: COMPLETED | OUTPATIENT
Start: 2023-10-31 | End: 2023-10-31

## 2023-10-31 RX ORDER — IBUPROFEN 200 MG
1 TABLET ORAL
Qty: 28 | Refills: 0
Start: 2023-10-31 | End: 2023-11-06

## 2023-10-31 RX ADMIN — Medication 650 MILLIGRAM(S): at 15:01

## 2023-10-31 NOTE — ED PROVIDER NOTE - NSFOLLOWUPINSTRUCTIONS_ED_ALL_ED_FT
Take Ibuprofen 600mg every 6-8 hours as needed for pain, take with food, and in addition you may take Tylenol 500 mg every 6-8 hours as needed for pain    Follow up with Orthopedics    Wear ace bandage    Return for any concerning or worsening symptoms.

## 2023-10-31 NOTE — ED ADULT TRIAGE NOTE - CHIEF COMPLAINT QUOTE
Pt states increased left wrist pain x days bc "carrying a lot of heavy bags". Pt states increased lower back pain bc "living [place] the [address]". Denies falls or trauma.

## 2023-10-31 NOTE — ED PROVIDER NOTE - PATIENT PORTAL LINK FT
You can access the FollowMyHealth Patient Portal offered by [facility] by registering at the following website: [email or web address]. By joining [facility]’s FollowMyHealth portal, you will also be able to view your health information using other applications (apps) compatible with our system.

## 2023-10-31 NOTE — ED PROVIDER NOTE - PHYSICAL EXAMINATION
CONSTITUTIONAL: Well-appearing; well-nourished; in no apparent distress.   LUE: normal appearance, nontender, good ROM wrist/hand, good cap refill, dpi, soft compartments.   	NEURO: A & O x 3; face symmetric; grossly unremarkable.   PSYCHOLOGICAL: The patient’s mood and manner are appropriate.

## 2023-10-31 NOTE — ED PROVIDER NOTE - CARE PROVIDER_API CALL
Pedrito Valdes  Orthopaedic Surgery  [address] [place] [address]  Phone: [phone]  Fax: [phone]  Follow Up Time:
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
## 2023-11-02 DIAGNOSIS — M25.532 PAIN IN LEFT WRIST: ICD-10-CM

## 2023-11-02 DIAGNOSIS — Z91.010 ALLERGY TO PEANUTS: ICD-10-CM

## 2023-11-02 DIAGNOSIS — F32.A DEPRESSION, UNSPECIFIED: ICD-10-CM

## 2024-04-24 NOTE — ED PROVIDER NOTE - NS ED ATTENDING STATEMENT MOD
[FreeTextEntry1] : General: The patient was alert and oriented and in no distress. Voice was clear.  Face: The patient had no facial asymmetry or mass. The skin was unremarkable.   The patient is status post inferior turbinoplasty's and nasal valve remodeling and returns for  debridement.  Procedure note:  80031-59  The nasal cavity was anesthetized topically and locally.  Using a rigid 0 and a rigid 30 endoscope, using both straight and curved suction iand a straight and the 30 up-biting forceps, both middle meati were suctioned and debrided to clear.  Crusting was removed from the nasal valves.  Evaluation of the middle meati showed no evidence of sinusitis.  The inferior turbinates were debrided again.  The patient was told to continue on nasal steroids and hypertonic saline rinses.  I recommended a repeat evaluation and cleaning in 3 weeks.   Flexible fiberoptic laryngoscopy: CPT 90700 Indications: Dysphagia Procedure note:  Flexible fiberoptic laryngoscopy was performed because of dysphagia and because of the patient's inability to tolerate adequate mirror examination.  The nasal cavity was anesthetized with Pontocaine and Afrin. The flexible endoscope was placed into the patient's nasal cavity. The nasopharynx was without masses. The oropharynx, vallecula and base of tongue had no masses. The epiglottis, aryepiglottic folds and false vocal cords were normal. The pyriform sinuses were without mucosal lesions or pooling of secretions.   The laryngeal ventricles were without lesions. The visualized subglottis was without mass. The lateral and posterior pharyngeal walls were clear and symmetrical. The vocal folds were clear and mobile; they abducted and adducted normally. There was no interarytenoid mass or fullness.  Endoscopy was done with Covid precautions and with video. All risks and benefits were discussed with the patient and consent obtained.
Attending Only

## 2024-06-19 NOTE — ED PROVIDER NOTE - DISPOSITION TYPE
06/19/24                            Chelo Humphrey  [id number]  [facility] [address]    To Whom It May Concern:    This is to certify Cehlo Humphrey was evaluated with Shima Smiley MD on 06/19/24 and should be excused from work 6/19/24 due to medical reasons.           Shima Smiley MD  [facility]-[address] [address]  Dept Phone: [phone]       DISCHARGE

## 2024-10-15 NOTE — ED ADULT TRIAGE NOTE - HEART RATE (BEATS/MIN)
Please use Tylenol and Motrin as needed for aches and pains and this will help with his cough.  Please return for recheck for worrisome or worsening symptoms.  Best wishes, Dr. Caraballo   89

## 2025-02-20 ENCOUNTER — EMERGENCY (EMERGENCY)
Age: 40
LOS: 1 days | Discharge: HOME | End: 2025-02-20
Attending: EMERGENCY MEDICINE | Admitting: EMERGENCY MEDICINE
Payer: MEDICAID

## 2025-02-20 VITALS
DIASTOLIC BLOOD PRESSURE: 87 MMHG | RESPIRATION RATE: 18 BRPM | WEIGHT: 153.44 LBS | TEMPERATURE: 98 F | HEART RATE: 97 BPM | OXYGEN SATURATION: 98 % | SYSTOLIC BLOOD PRESSURE: 132 MMHG

## 2025-02-20 VITALS — HEART RATE: 91 BPM

## 2025-02-20 DIAGNOSIS — R29.818 OTHER SYMPTOMS AND SIGNS INVOLVING THE NERVOUS SYSTEM: ICD-10-CM

## 2025-02-20 DIAGNOSIS — Z87.820 PERSONAL HISTORY OF TRAUMATIC BRAIN INJURY: ICD-10-CM

## 2025-02-20 DIAGNOSIS — Z59.00 HOMELESSNESS UNSPECIFIED: ICD-10-CM

## 2025-02-20 DIAGNOSIS — G40.909 EPILEPSY, UNSPECIFIED, NOT INTRACTABLE, WITHOUT STATUS EPILEPTICUS: ICD-10-CM

## 2025-02-20 DIAGNOSIS — Z91.010 ALLERGY TO PEANUTS: ICD-10-CM

## 2025-02-20 DIAGNOSIS — F17.210 NICOTINE DEPENDENCE, CIGARETTES, UNCOMPLICATED: ICD-10-CM

## 2025-02-20 LAB
ALBUMIN SERPL ELPH-MCNC: 3.4 G/DL — SIGNIFICANT CHANGE UP (ref 3.4–5)
ALP SERPL-CCNC: 74 U/L — SIGNIFICANT CHANGE UP (ref 40–120)
ALT FLD-CCNC: 31 U/L — SIGNIFICANT CHANGE UP (ref 12–42)
ANION GAP SERPL CALC-SCNC: 5 MMOL/L — LOW (ref 9–16)
APTT BLD: 28.8 SEC — SIGNIFICANT CHANGE UP (ref 24.5–35.6)
AST SERPL-CCNC: 13 U/L — LOW (ref 15–37)
BASOPHILS # BLD AUTO: 0.06 K/UL — SIGNIFICANT CHANGE UP (ref 0–0.2)
BASOPHILS NFR BLD AUTO: 0.8 % — SIGNIFICANT CHANGE UP (ref 0–2)
BILIRUB SERPL-MCNC: 0.2 MG/DL — SIGNIFICANT CHANGE UP (ref 0.2–1.2)
BUN SERPL-MCNC: 13 MG/DL — SIGNIFICANT CHANGE UP (ref 7–23)
CALCIUM SERPL-MCNC: 8.8 MG/DL — SIGNIFICANT CHANGE UP (ref 8.5–10.5)
CHLORIDE SERPL-SCNC: 108 MMOL/L — SIGNIFICANT CHANGE UP (ref 96–108)
CO2 SERPL-SCNC: 30 MMOL/L — SIGNIFICANT CHANGE UP (ref 22–31)
CREAT SERPL-MCNC: 0.88 MG/DL — SIGNIFICANT CHANGE UP (ref 0.5–1.3)
EGFR: 111 ML/MIN/1.73M2 — SIGNIFICANT CHANGE UP
EGFR: 111 ML/MIN/1.73M2 — SIGNIFICANT CHANGE UP
EOSINOPHIL # BLD AUTO: 0.22 K/UL — SIGNIFICANT CHANGE UP (ref 0–0.5)
EOSINOPHIL NFR BLD AUTO: 2.8 % — SIGNIFICANT CHANGE UP (ref 0–6)
GLUCOSE SERPL-MCNC: 94 MG/DL — SIGNIFICANT CHANGE UP (ref 70–99)
HCT VFR BLD CALC: 41.2 % — SIGNIFICANT CHANGE UP (ref 39–50)
HGB BLD-MCNC: 13.3 G/DL — SIGNIFICANT CHANGE UP (ref 13–17)
HIV 1 & 2 AB SERPL IA.RAPID: SIGNIFICANT CHANGE UP
IMM GRANULOCYTES # BLD AUTO: 0.01 K/UL — SIGNIFICANT CHANGE UP (ref 0–0.07)
IMM GRANULOCYTES NFR BLD AUTO: 0.1 % — SIGNIFICANT CHANGE UP (ref 0–0.9)
INR BLD: 0.88 — SIGNIFICANT CHANGE UP (ref 0.85–1.16)
LYMPHOCYTES # BLD AUTO: 1.44 K/UL — SIGNIFICANT CHANGE UP (ref 1–3.3)
LYMPHOCYTES NFR BLD AUTO: 18.1 % — SIGNIFICANT CHANGE UP (ref 13–44)
MAGNESIUM SERPL-MCNC: 2 MG/DL — SIGNIFICANT CHANGE UP (ref 1.6–2.6)
MCHC RBC-ENTMCNC: 28.9 PG — SIGNIFICANT CHANGE UP (ref 27–34)
MCHC RBC-ENTMCNC: 32.3 G/DL — SIGNIFICANT CHANGE UP (ref 32–36)
MCV RBC AUTO: 89.4 FL — SIGNIFICANT CHANGE UP (ref 80–100)
MONOCYTES # BLD AUTO: 0.51 K/UL — SIGNIFICANT CHANGE UP (ref 0–0.9)
MONOCYTES NFR BLD AUTO: 6.4 % — SIGNIFICANT CHANGE UP (ref 2–14)
NEUTROPHILS # BLD AUTO: 5.71 K/UL — SIGNIFICANT CHANGE UP (ref 1.8–7.4)
NEUTROPHILS NFR BLD AUTO: 71.8 % — SIGNIFICANT CHANGE UP (ref 43–77)
NRBC # BLD AUTO: 0 K/UL — SIGNIFICANT CHANGE UP (ref 0–0)
NRBC # FLD: 0 K/UL — SIGNIFICANT CHANGE UP (ref 0–0)
NRBC BLD AUTO-RTO: 0 /100 WBCS — SIGNIFICANT CHANGE UP (ref 0–0)
PLATELET # BLD AUTO: 399 K/UL — SIGNIFICANT CHANGE UP (ref 150–400)
PMV BLD: 8.9 FL — SIGNIFICANT CHANGE UP (ref 7–13)
POTASSIUM SERPL-MCNC: 3.9 MMOL/L — SIGNIFICANT CHANGE UP (ref 3.5–5.3)
POTASSIUM SERPL-SCNC: 3.9 MMOL/L — SIGNIFICANT CHANGE UP (ref 3.5–5.3)
PROT SERPL-MCNC: 6.6 G/DL — SIGNIFICANT CHANGE UP (ref 6.4–8.2)
PROTHROM AB SERPL-ACNC: 10.2 SEC — SIGNIFICANT CHANGE UP (ref 9.9–13.4)
RBC # BLD: 4.61 M/UL — SIGNIFICANT CHANGE UP (ref 4.2–5.8)
RBC # FLD: 14.1 % — SIGNIFICANT CHANGE UP (ref 10.3–14.5)
SODIUM SERPL-SCNC: 143 MMOL/L — SIGNIFICANT CHANGE UP (ref 132–145)
TROPONIN I, HIGH SENSITIVITY RESULT: 6.6 NG/L — SIGNIFICANT CHANGE UP
WBC # BLD: 7.95 K/UL — SIGNIFICANT CHANGE UP (ref 3.8–10.5)
WBC # FLD AUTO: 7.95 K/UL — SIGNIFICANT CHANGE UP (ref 3.8–10.5)

## 2025-02-20 PROCEDURE — 99285 EMERGENCY DEPT VISIT HI MDM: CPT

## 2025-02-20 PROCEDURE — 70450 CT HEAD/BRAIN W/O DYE: CPT | Mod: 26

## 2025-02-20 RX ORDER — LEVETIRACETAM 10 MG/ML
1 INJECTION, SOLUTION INTRAVENOUS
Qty: 60 | Refills: 0
Start: 2025-02-20 | End: 2025-03-21

## 2025-02-20 RX ORDER — CLONAZEPAM 0.5 MG/1
1 TABLET ORAL ONCE
Refills: 0 | Status: DISCONTINUED | OUTPATIENT
Start: 2025-02-20 | End: 2025-02-20

## 2025-02-20 RX ORDER — LEVETIRACETAM 10 MG/ML
1000 INJECTION, SOLUTION INTRAVENOUS ONCE
Refills: 0 | Status: COMPLETED | OUTPATIENT
Start: 2025-02-20 | End: 2025-02-20

## 2025-02-20 SDOH — ECONOMIC STABILITY - HOUSING INSECURITY: HOMELESSNESS UNSPECIFIED: Z59.00

## 2025-04-02 NOTE — ED PROVIDER NOTE - NS ED ATTENDING NAME FT
Anesthesia Pre Eval Note    Anesthesia ROS/Med Hx        Anesthetic Complication History:    Patient does not have a history of anesthetic complications      Pulmonary Review:  Patient does not have a pulmonary history      Neuro/Psych Review:  Patient does not have a neuro/psych history         Cardiovascular Review:     Positive for CAD  Positive for valvular problems/murmurs  Positive for hypertension  Positive for hyperlipidemia    GI/HEPATIC/RENAL Review:     Positive for renal disease    End/Other Review:  Patient does not have an endo/other history        Relevant Problems   No relevant active problems       Physical Exam     Airway   Mallampati: II  TM Distance: >3 FB  Neck ROM: Full  Neck: Non-tender and Able to place in sniff position  TMJ Mobility: Good    Cardiovascular  Cardiovascular exam normal  Cardio Rhythm: Regular  Cardio Rate: Normal    Head Assessment  Head assessment: Normocephalic and Atraumatic    General Assessment  General Assessment: Alert and oriented and No acute distress    Dental Exam  Dental exam normal    Pulmonary Exam  Pulmonary exam normal  Breath sounds clear to auscultation:  Yes    Abdominal Exam  Abdominal exam normal      Anesthesia Plan:    ASA Status: 3  Anesthesia Type: MAC    Induction: Intravenous  Maintenance: TIVA    Post-op Pain Management: Per Surgeon      Checklist  Reviewed: NPO Status, Allergies, Medications, Problem list, Past Med History and Patient Summary  Consent/Risks Discussed Statement:  The proposed anesthetic plan, including its risks and benefits, have been discussed with the Patient along with the risks and benefits of alternatives. Questions were encouraged and answered and the patient and/or representative understands and agrees to proceed.        I discussed with the patient (and/or patient's legal representative) the risks and benefits of the proposed anesthesia plan, MAC, which may include services performed by other anesthesia  providers.    Alternative anesthesia plans, if available, were reviewed with the patient (and/or patient's legal representative). Discussion has been held with the patient (and/or patient's legal representative) regarding risks of anesthesia, which include Intra-operative Awareness and emergent situations that may require change in anesthesia plan.    The patient (and/or patient's legal representative) has indicated understanding, his/her questions have been answered, and he/she wishes to proceed with the planned anesthetic.    Blood Products: Not Anticipated     tocco

## 2025-05-27 NOTE — ED BEHAVIORAL HEALTH ASSESSMENT NOTE - NS ED [facility] REVIEW OF ED CHART AVAILABLE IMAGING REVIEWED
Care  assisting Care Mgr Najma Smith RN with Discharge Planning needs for patient.  This writer assisted in establishing a follow up appointment with a Primary Care Physician (PCP) and to establish care at [facility] [phone].  Annie Dupont, Nurse Liaison will contact patient with follow up appointment date/time.  Clinicals faxed as requested.    Will follow for further needs.   None available